# Patient Record
Sex: FEMALE | Race: WHITE | NOT HISPANIC OR LATINO | Employment: FULL TIME | URBAN - METROPOLITAN AREA
[De-identification: names, ages, dates, MRNs, and addresses within clinical notes are randomized per-mention and may not be internally consistent; named-entity substitution may affect disease eponyms.]

---

## 2017-04-29 ENCOUNTER — APPOINTMENT (OUTPATIENT)
Dept: RADIOLOGY | Facility: MEDICAL CENTER | Age: 37
End: 2017-04-29
Attending: EMERGENCY MEDICINE
Payer: COMMERCIAL

## 2017-04-29 ENCOUNTER — HOSPITAL ENCOUNTER (EMERGENCY)
Facility: MEDICAL CENTER | Age: 37
End: 2017-04-29
Attending: EMERGENCY MEDICINE
Payer: COMMERCIAL

## 2017-04-29 VITALS
HEART RATE: 85 BPM | OXYGEN SATURATION: 99 % | DIASTOLIC BLOOD PRESSURE: 70 MMHG | SYSTOLIC BLOOD PRESSURE: 120 MMHG | BODY MASS INDEX: 23.54 KG/M2 | HEIGHT: 67 IN | WEIGHT: 150 LBS | RESPIRATION RATE: 16 BRPM | TEMPERATURE: 97.9 F

## 2017-04-29 DIAGNOSIS — T07.XXXA MULTIPLE CONTUSIONS: ICD-10-CM

## 2017-04-29 DIAGNOSIS — S92.155A CLOSED NONDISPLACED AVULSION FRACTURE OF LEFT TALUS, INITIAL ENCOUNTER: ICD-10-CM

## 2017-04-29 DIAGNOSIS — S92.425A CLOSED NONDISPLACED FRACTURE OF DISTAL PHALANX OF LEFT GREAT TOE, INITIAL ENCOUNTER: ICD-10-CM

## 2017-04-29 PROCEDURE — 99284 EMERGENCY DEPT VISIT MOD MDM: CPT

## 2017-04-29 PROCEDURE — 29515 APPLICATION SHORT LEG SPLINT: CPT

## 2017-04-29 PROCEDURE — 73610 X-RAY EXAM OF ANKLE: CPT | Mod: LT

## 2017-04-29 PROCEDURE — 96374 THER/PROPH/DIAG INJ IV PUSH: CPT

## 2017-04-29 PROCEDURE — 302874 HCHG BANDAGE ACE 2 OR 3""

## 2017-04-29 PROCEDURE — 73630 X-RAY EXAM OF FOOT: CPT | Mod: LT

## 2017-04-29 PROCEDURE — 302875 HCHG BANDAGE ACE 4 OR 6""

## 2017-04-29 PROCEDURE — 73590 X-RAY EXAM OF LOWER LEG: CPT | Mod: RT

## 2017-04-29 PROCEDURE — 700111 HCHG RX REV CODE 636 W/ 250 OVERRIDE (IP): Performed by: EMERGENCY MEDICINE

## 2017-04-29 RX ORDER — ONDANSETRON 2 MG/ML
4 INJECTION INTRAMUSCULAR; INTRAVENOUS ONCE
Status: DISCONTINUED | OUTPATIENT
Start: 2017-04-29 | End: 2017-04-29 | Stop reason: HOSPADM

## 2017-04-29 RX ORDER — VENLAFAXINE 50 MG/1
50 TABLET ORAL 3 TIMES DAILY
COMMUNITY

## 2017-04-29 RX ORDER — HYDROCODONE BITARTRATE AND ACETAMINOPHEN 5; 325 MG/1; MG/1
1 TABLET ORAL EVERY 6 HOURS PRN
Qty: 15 TAB | Refills: 0 | Status: SHIPPED | OUTPATIENT
Start: 2017-04-29

## 2017-04-29 RX ORDER — ALPRAZOLAM 1 MG/1
1 TABLET ORAL NIGHTLY PRN
COMMUNITY

## 2017-04-29 RX ORDER — KETOROLAC TROMETHAMINE 30 MG/ML
30 INJECTION, SOLUTION INTRAMUSCULAR; INTRAVENOUS ONCE
Status: COMPLETED | OUTPATIENT
Start: 2017-04-29 | End: 2017-04-29

## 2017-04-29 RX ADMIN — KETOROLAC TROMETHAMINE 30 MG: 30 INJECTION, SOLUTION INTRAMUSCULAR at 18:43

## 2017-04-29 ASSESSMENT — LIFESTYLE VARIABLES: DO YOU DRINK ALCOHOL: NO

## 2017-04-29 ASSESSMENT — PAIN SCALES - GENERAL: PAINLEVEL_OUTOF10: 3

## 2017-04-29 NOTE — ED AVS SNAPSHOT
Home Care Instructions                                                                                                                Cee Schaeffer   MRN: [id number]    Department:  [facility], Emergency Dept   Date of Visit:  4/29/2017            [facility], Emergency Dept    [address] [place] [id number]    Phone:  [phone]      You were seen by     Norm Jarquin M.D.      Your Diagnosis Was     Closed nondisplaced avulsion fracture of left talus, initial encounter     S92.155A       These are the medications you received during your hospitalization from 04/29/2017 1704 to 04/29/2017 1946     Date/Time Order Dose Route Action    04/29/2017 1745 HYDROmorphone (DILAUDID) injection 0.5 mg 0.5 mg Intravenous Refused    04/29/2017 1745 ondansetron (ZOFRAN) syringe/vial injection 4 mg 4 mg Intravenous Refused    04/29/2017 1843 ketorolac (TORADOL) injection 30 mg Intravenous Given      Follow-up Information     1. Follow up with [facility], Emergency Dept.    Specialty:  Emergency Medicine    Why:  As needed    Contact information    [address] [place] [id number] [phone]      Medication Information     Review all of your home medications and newly ordered medications with your primary doctor and/or pharmacist as soon as possible. Follow medication instructions as directed by your doctor and/or pharmacist.     Please keep your complete medication list with you and share with your physician. Update the information when medications are discontinued, doses are changed, or new medications (including over-the-counter products) are added; and carry medication information at all times in the event of emergency situations.               Medication List      START taking these medications        Instructions    Morning Afternoon Evening Bedtime    hydrocodone-acetaminophen 5-325 MG Tabs per tablet   Commonly known as:  NORCO        Take  1 Tab by mouth every 6 hours as needed.   Dose:  1 Tab                          ASK your doctor about these medications        Instructions    Morning Afternoon Evening Bedtime    alprazolam 1 MG Tabs   Commonly known as:  XANAX        Take 1 mg by mouth at bedtime as needed for Sleep.   Dose:  1 mg                        venlafaxine 50 MG tablet   Commonly known as:  EFFEXOR        Take 50 mg by mouth 3 times a day.   Dose:  50 mg                             Where to Get Your Medications      You can get these medications from any pharmacy     Bring a paper prescription for each of these medications    - hydrocodone-acetaminophen 5-325 MG Tabs per tablet            Procedures and tests performed during your visit     DX-ANKLE 3+ VIEWS LEFT    DX-FOOT-COMPLETE 3+ LEFT    DX-TIBIA AND FIBULA RIGHT        Discharge Instructions       Cast or Splint Care  Casts and splints support injured limbs and keep bones from moving while they heal. It is important to care for your cast or splint at home.    HOME CARE INSTRUCTIONS  · Keep the cast or splint uncovered during the drying period. It can take 24 to 48 hours to dry if it is made of plaster. A fiberglass cast will dry in less than 1 hour.  · Do not rest the cast on anything harder than a pillow for the first 24 hours.  · Do not put weight on your injured limb or apply pressure to the cast until your health care provider gives you permission.  · Keep the cast or splint dry. Wet casts or splints can lose their shape and may not support the limb as well. A wet cast that has lost its shape can also create harmful pressure on your skin when it dries. Also, wet skin can become infected.  ¨ Cover the cast or splint with a plastic bag when bathing or when out in the rain or snow. If the cast is on the trunk of the body, take sponge baths until the cast is removed.  ¨ If your cast does become wet, dry it with a towel or a blow dryer on the cool setting only.  · Keep your cast or  splint clean. Soiled casts may be wiped with a moistened cloth.  · Do not place any hard or soft foreign objects under your cast or splint, such as cotton, toilet paper, lotion, or powder.  · Do not try to scratch the skin under the cast with any object. The object could get stuck inside the cast. Also, scratching could lead to an infection. If itching is a problem, use a blow dryer on a cool setting to relieve discomfort.  · Do not trim or cut your cast or remove padding from inside of it.  · Exercise all joints next to the injury that are not immobilized by the cast or splint. For example, if you have a long leg cast, exercise the hip joint and toes. If you have an arm cast or splint, exercise the shoulder, elbow, thumb, and fingers.  · Elevate your injured arm or leg on 1 or 2 pillows for the first 1 to 3 days to decrease swelling and pain. It is best if you can comfortably elevate your cast so it is higher than your heart.  SEEK MEDICAL CARE IF:   · Your cast or splint cracks.  · Your cast or splint is too tight or too loose.  · You have unbearable itching inside the cast.  · Your cast becomes wet or develops a soft spot or area.  · You have a bad smell coming from inside your cast.  · You get an object stuck under your cast.  · Your skin around the cast becomes red or raw.  · You have new pain or worsening pain after the cast has been applied.  SEEK IMMEDIATE MEDICAL CARE IF:   · You have fluid leaking through the cast.  · You are unable to move your fingers or toes.  · You have discolored (blue or white), cool, painful, or very swollen fingers or toes beyond the cast.  · You have tingling or numbness around the injured area.  · You have severe pain or pressure under the cast.  · You have any difficulty with your breathing or have shortness of breath.  · You have chest pain.     This information is not intended to replace advice given to you by your health care provider. Make sure you discuss any questions you  have with your health care provider.     Document Released: 12/15/2001 Document Revised: 10/08/2014 Document Reviewed: 06/26/2014  Elsevier Interactive Patient Education ©2016 [employer]            Patient Information     Patient Information    Following emergency treatment: all patient requiring follow-up care must return either to a private physician or a clinic if your condition worsens before you are able to obtain further medical attention, please return to the emergency room.     Billing Information    At [facility], we work to make the billing process streamlined for our patients.  Our Representatives are here to answer any questions you may have regarding your hospital bill.  If you have insurance coverage and have supplied your insurance information to us, we will submit a claim to your insurer on your behalf.  Should you have any questions regarding your bill, we can be reached online or by phone as follows:  Online: You are able pay your bills online or live chat with our representatives about any billing questions you may have. We are here to help Monday - Friday from 8:00am to 7:30pm and 9:00am - 12:00pm on Saturdays.  Please visit [email or web address]  for more information.   Phone:  [phone] or [phone]    Please note that your emergency physician, surgeon, pathologist, radiologist, anesthesiologist, and other specialists are not employed by [facility] and will therefore bill separately for their services.  Please contact them directly for any questions concerning their bills at the numbers below:     Emergency Physician Services:  [phone]  [facility]:  [phone]  [facility]:  [phone]  [facility]:  [phone]    1. Your final bill may vary from the amount quoted upon discharge if all procedures are not complete at that time, or if your doctor has additional procedures of which we are not  aware. You will receive an additional bill if you return to the Emergency Department at [facility] for suture removal regardless of the facility of which the sutures were placed.     2. Please arrange for settlement of this account at the emergency registration.    3. All self-pay accounts are due in full at the time of treatment.  If you are unable to meet this obligation then payment is expected within 4-5 days.     4. If you have had radiology studies (CT, X-ray, Ultrasound, MRI), you have received a preliminary result during your emergency department visit. Please contact the radiology department [phone] to receive a copy of your final result. Please discuss the Final result with your primary physician or with the follow up physician provided.     Crisis Hotline:  [place] Crisis Hotline:  2-173-EVAYBZZ or 1-808.654.3843  [place] Crisis Hotline:    1-250.456.7580 or 090-293-6042         ED Discharge Follow Up Questions    1. In order to provide you with very good care, we would like to follow up with a phone call in the next few days.  May we have your permission to contact you?     YES /  NO    2. What is the best phone number to call you? (       )_____-__________    3. What is the best time to call you?      Morning  /  Afternoon  /  Evening                   Patient Signature:  ____________________________________________________________    Date:  ____________________________________________________________

## 2017-04-29 NOTE — ED AVS SNAPSHOT
4/29/2017    Cee Schaeffer  [address] [place] [address]    Dear Cee:    [facility] wants to ensure your discharge home is safe and you or your loved ones have had all of your questions answered regarding your care after you leave the hospital.    Below is a list of resources and contact information should you have any questions regarding your hospital stay, follow-up instructions, or active medical symptoms.    Questions or Concerns Regarding… Contact   Medical Questions Related to Your Discharge  (7 days a week, 8am-5pm) Contact a Nurse Care Coordinator   [phone]   Medical Questions Not Related to Your Discharge  (24 hours a day / 7 days a week)  Contact the Nurse Health Line   [phone]    Medications or Discharge Instructions Refer to your discharge packet   or contact your [facility] Primary Care Provider   [phone]   Follow-up Appointment(s) Schedule your appointment via Wami   or contact Scheduling [phone]   Billing Review your statement via Wami  or contact Billing [phone]   Medical Records Review your records via Wami   or contact Medical Records [phone]     You may receive a telephone call within two days of discharge. This call is to make certain you understand your discharge instructions and have the opportunity to have any questions answered. You can also easily access your medical information, test results and upcoming appointments via the Wami free online health management tool. You can learn more and sign up at [employer]/Wami. For assistance setting up your Wami account, please call [phone].    Once again, we want to ensure your discharge home is safe and that you have a clear understanding of any next steps in your care. If you have any questions or concerns, please do not hesitate to contact us, we are here for you. Thank you for choosing [facility] for your healthcare needs.    Sincerely,    Your [facility] Healthcare Team

## 2017-04-29 NOTE — ED AVS SNAPSHOT
[employer] Access Code: [email or web address]  Expires: 5/29/2017  7:45 PM    Your email address is not on file at [facility].  Email Addresses are required for you to sign up for [employer], please contact [phone] to verify your personal information and to provide your email address prior to attempting to register for [employer] [address]    [employer]  A secure, online tool to manage your health information     [employer]’s [employer]® is a secure, online tool that connects you to your personalized health information from the privacy of your home -- day or night - making it very easy for you to manage your healthcare. Once the activation process is completed, you can even access your medical information using the [employer] natalya, which is available for free in the Apple Natalya store or Google Play store.     To learn more about [employer], visit [email or web address]    There are two levels of access available (as shown below):   My Chart Features  [facility] Primary Care Doctor [facility]  Specialists [facility]  Urgent  Care Non-[facility] Primary Care Doctor   Email your healthcare team securely and privately 24/7 X X X    Manage appointments: schedule your next appointment; view details of past/upcoming appointments X      Request prescription refills. X      View recent personal medical records, including lab and immunizations X X X X   View health record, including health history, allergies, medications X X X X   Read reports about your outpatient visits, procedures, consult and ER notes X X X X   See your discharge summary, which is a recap of your hospital and/or ER visit that includes your diagnosis, lab results, and care plan X X  X     How to register for [employer]:  Once your e-mail address has been verified, follow the following steps to sign up for [employer].     1. Go to  [email or web address]  2. Click on the Sign Up Now box, which takes you to the New Member Sign Up page. You will need  to provide the following information:  a. Enter your [employer] Access Code exactly as it appears at the top of this page. (You will not need to use this code after you’ve completed the sign-up process. If you do not sign up before the expiration date, you must request a new code.)   b. Enter your date of birth.   c. Enter your home email address.   d. Click Submit, and follow the next screen’s instructions.  3. Create a [employer] ID. This will be your [employer] login ID and cannot be changed, so think of one that is secure and easy to remember.  4. Create a [employer] password. You can change your password at any time.  5. Enter your Password Reset Question and Answer. This can be used at a later time if you forget your password.   6. Enter your e-mail address. This allows you to receive e-mail notifications when new information is available in [employer].  7. Click Sign Up. You can now view your health information.    For assistance activating your [employer] account, call [phone]

## 2017-04-30 NOTE — DISCHARGE INSTRUCTIONS
Cast or Splint Care  Casts and splints support injured limbs and keep bones from moving while they heal. It is important to care for your cast or splint at home.    HOME CARE INSTRUCTIONS  · Keep the cast or splint uncovered during the drying period. It can take 24 to 48 hours to dry if it is made of plaster. A fiberglass cast will dry in less than 1 hour.  · Do not rest the cast on anything harder than a pillow for the first 24 hours.  · Do not put weight on your injured limb or apply pressure to the cast until your health care provider gives you permission.  · Keep the cast or splint dry. Wet casts or splints can lose their shape and may not support the limb as well. A wet cast that has lost its shape can also create harmful pressure on your skin when it dries. Also, wet skin can become infected.  ¨ Cover the cast or splint with a plastic bag when bathing or when out in the rain or snow. If the cast is on the trunk of the body, take sponge baths until the cast is removed.  ¨ If your cast does become wet, dry it with a towel or a blow dryer on the cool setting only.  · Keep your cast or splint clean. Soiled casts may be wiped with a moistened cloth.  · Do not place any hard or soft foreign objects under your cast or splint, such as cotton, toilet paper, lotion, or powder.  · Do not try to scratch the skin under the cast with any object. The object could get stuck inside the cast. Also, scratching could lead to an infection. If itching is a problem, use a blow dryer on a cool setting to relieve discomfort.  · Do not trim or cut your cast or remove padding from inside of it.  · Exercise all joints next to the injury that are not immobilized by the cast or splint. For example, if you have a long leg cast, exercise the hip joint and toes. If you have an arm cast or splint, exercise the shoulder, elbow, thumb, and fingers.  · Elevate your injured arm or leg on 1 or 2 pillows for the first 1 to 3 days to decrease  swelling and pain. It is best if you can comfortably elevate your cast so it is higher than your heart.  SEEK MEDICAL CARE IF:   · Your cast or splint cracks.  · Your cast or splint is too tight or too loose.  · You have unbearable itching inside the cast.  · Your cast becomes wet or develops a soft spot or area.  · You have a bad smell coming from inside your cast.  · You get an object stuck under your cast.  · Your skin around the cast becomes red or raw.  · You have new pain or worsening pain after the cast has been applied.  SEEK IMMEDIATE MEDICAL CARE IF:   · You have fluid leaking through the cast.  · You are unable to move your fingers or toes.  · You have discolored (blue or white), cool, painful, or very swollen fingers or toes beyond the cast.  · You have tingling or numbness around the injured area.  · You have severe pain or pressure under the cast.  · You have any difficulty with your breathing or have shortness of breath.  · You have chest pain.     This information is not intended to replace advice given to you by your health care provider. Make sure you discuss any questions you have with your health care provider.     Document Released: 12/15/2001 Document Revised: 10/08/2014 Document Reviewed: 06/26/2014  [employer] Interactive Patient Education ©2016 Elsevier Inc.

## 2017-04-30 NOTE — ED NOTES
EMS splint removed and pt helped to take her pants off. Pt is requesting pain medication right before the xrays. + sensation to left foot; + pedal pulses. Resting on gurney, aware of POC.

## 2017-04-30 NOTE — ED NOTES
Patient brought in by EMS after falling off of a horse; horse also fell onto patient's left leg. Pt given 50 fentanyl by ems and put on 2L o2 NC per patient request. A&OX4. NAD noted.

## 2017-04-30 NOTE — ED PROVIDER NOTES
ED Provider Note      HPI: Patient is a 36-year-old female who presented to the emergency department by ambulance transfer April 29, 2017 at 5:04 PM with a chief complaint of leg pain.    Patient was riding a horse and fell off. The patient then landed on her left leg. Patient has pain in the right proximal tib-fib region, the left ankle area and left foot region primarily in the area of the great toe. Patient hit her head but had no loss of consciousness and does not have a headache at present. Patient denied neck pain chest pain shortness of breath or abdominal pain. No numbness or tingling of her extremities. Patient was given fentanyl by paramedics with improvement. No other somatic complaints.    Review of Systems: Positive for left ankle and left great toe pain right lower extremity pain just below the knee negative for headache loss of consciousness neck pain shortness of breath abdominal pain.    Past medical/surgical history: none    Medications: Effexor  Xanax    Allergies:  None    Social History: Patient does not smoke no alcohol use      Physical exam: Constitutional: Pleasant female awake and alert  Vital signs:  Temperature 97.9 pulse 99 respirations 16 blood pressure 122/70 pulse oximetry 99% no pain with midline palpation.  Neck: Trachea midline. No cervical masses seen or palpated. Normal range of motion, supple. No meningeal signs elicited. No pain with midline palpation.  Musculoskeletal: Patient complains of pain with palpation of the left ankle laterally the left great toe and the right lower extremity just below the knee. No crepitance is felt no obvious deformity seen. Capillary refill 2 seconds bilaterally  Neurologic: alert and awake answers questions appropriately. Decreased range of motion of left lower extremity due to pain in the ankle area. Other than this, no focal neurologic abnormalities identified.  Skin: no rash or lesion seen, no palpable dermatologic lesions identified.  Specifically no break in the skin on either the lower extremities.  Psychiatric: not anxious, delusional, or hallucinating.    Medical decision making:  Patient given hydromorphone and Zofran with improvement    X-rays of right tib-fib left ankle left foot obtained; a chip fractures noted off the neck of the talus and a nondisplaced fracture of the distal great toe is noted. Right tib-fib film was normal.    Great toe elias taped his 2nd toe. Patient placed in an OCL from toes to midcalf. Dr. Deras consulted for orthopedics and agreed with this plan.. The patient would prefer to follow up at home. (She is visiting here from [place]) and she will follow-up next week. The patient has no evidence of a neurovascular injury and outpatient follow-up seems reasonable patient given the usual discharge instructions for foot fracture. She is carefully counseled to return the ED for increasing pain numbness or tingling. Patient verbalized understanding of these instructions and states she will comply    Impression talus fracture, nondisplaced, chip type, closed  2) right great toe fracture nondisplaced, closed

## 2017-04-30 NOTE — ED NOTES
Discharge instructions explained to patient. Patient verbalized understanding. Prescriptions given to patient. Vital signs WNL upon discharge.  Patient ambulatory upon discharge on crutches.